# Patient Record
Sex: MALE | Race: WHITE
[De-identification: names, ages, dates, MRNs, and addresses within clinical notes are randomized per-mention and may not be internally consistent; named-entity substitution may affect disease eponyms.]

---

## 2021-03-02 ENCOUNTER — HOSPITAL ENCOUNTER (OUTPATIENT)
Dept: HOSPITAL 75 - ER | Age: 46
LOS: 2 days | Discharge: HOME | End: 2021-03-04
Attending: SURGERY
Payer: SELF-PAY

## 2021-03-02 VITALS — WEIGHT: 315 LBS | HEIGHT: 71.97 IN | BODY MASS INDEX: 42.66 KG/M2

## 2021-03-02 DIAGNOSIS — F17.210: ICD-10-CM

## 2021-03-02 DIAGNOSIS — E66.01: ICD-10-CM

## 2021-03-02 DIAGNOSIS — Z79.899: ICD-10-CM

## 2021-03-02 DIAGNOSIS — Z20.822: ICD-10-CM

## 2021-03-02 DIAGNOSIS — K35.80: Primary | ICD-10-CM

## 2021-03-02 PROCEDURE — 96365 THER/PROPH/DIAG IV INF INIT: CPT

## 2021-03-02 PROCEDURE — 44970 LAPAROSCOPY APPENDECTOMY: CPT

## 2021-03-02 PROCEDURE — 85007 BL SMEAR W/DIFF WBC COUNT: CPT

## 2021-03-02 PROCEDURE — 86141 C-REACTIVE PROTEIN HS: CPT

## 2021-03-02 PROCEDURE — 74177 CT ABD & PELVIS W/CONTRAST: CPT

## 2021-03-02 PROCEDURE — 85027 COMPLETE CBC AUTOMATED: CPT

## 2021-03-02 PROCEDURE — 94760 N-INVAS EAR/PLS OXIMETRY 1: CPT

## 2021-03-02 PROCEDURE — 81000 URINALYSIS NONAUTO W/SCOPE: CPT

## 2021-03-02 PROCEDURE — 96375 TX/PRO/DX INJ NEW DRUG ADDON: CPT

## 2021-03-02 PROCEDURE — 36415 COLL VENOUS BLD VENIPUNCTURE: CPT

## 2021-03-02 PROCEDURE — 88304 TISSUE EXAM BY PATHOLOGIST: CPT

## 2021-03-02 PROCEDURE — 87635 SARS-COV-2 COVID-19 AMP PRB: CPT

## 2021-03-02 PROCEDURE — 99284 EMERGENCY DEPT VISIT MOD MDM: CPT

## 2021-03-02 PROCEDURE — 96376 TX/PRO/DX INJ SAME DRUG ADON: CPT

## 2021-03-02 PROCEDURE — 80053 COMPREHEN METABOLIC PANEL: CPT

## 2021-03-03 VITALS — SYSTOLIC BLOOD PRESSURE: 116 MMHG | DIASTOLIC BLOOD PRESSURE: 73 MMHG

## 2021-03-03 VITALS — SYSTOLIC BLOOD PRESSURE: 102 MMHG | DIASTOLIC BLOOD PRESSURE: 80 MMHG

## 2021-03-03 VITALS — SYSTOLIC BLOOD PRESSURE: 130 MMHG | DIASTOLIC BLOOD PRESSURE: 66 MMHG

## 2021-03-03 VITALS — DIASTOLIC BLOOD PRESSURE: 76 MMHG | SYSTOLIC BLOOD PRESSURE: 121 MMHG

## 2021-03-03 VITALS — SYSTOLIC BLOOD PRESSURE: 108 MMHG | DIASTOLIC BLOOD PRESSURE: 67 MMHG

## 2021-03-03 VITALS — DIASTOLIC BLOOD PRESSURE: 69 MMHG | SYSTOLIC BLOOD PRESSURE: 123 MMHG

## 2021-03-03 VITALS — DIASTOLIC BLOOD PRESSURE: 84 MMHG | SYSTOLIC BLOOD PRESSURE: 107 MMHG

## 2021-03-03 VITALS — SYSTOLIC BLOOD PRESSURE: 127 MMHG | DIASTOLIC BLOOD PRESSURE: 77 MMHG

## 2021-03-03 VITALS — DIASTOLIC BLOOD PRESSURE: 82 MMHG | SYSTOLIC BLOOD PRESSURE: 105 MMHG

## 2021-03-03 VITALS — SYSTOLIC BLOOD PRESSURE: 112 MMHG | DIASTOLIC BLOOD PRESSURE: 73 MMHG

## 2021-03-03 VITALS — DIASTOLIC BLOOD PRESSURE: 69 MMHG | SYSTOLIC BLOOD PRESSURE: 107 MMHG

## 2021-03-03 VITALS — SYSTOLIC BLOOD PRESSURE: 114 MMHG | DIASTOLIC BLOOD PRESSURE: 75 MMHG

## 2021-03-03 VITALS — DIASTOLIC BLOOD PRESSURE: 85 MMHG | SYSTOLIC BLOOD PRESSURE: 146 MMHG

## 2021-03-03 LAB
ALBUMIN SERPL-MCNC: 4.1 GM/DL (ref 3.2–4.5)
ALP SERPL-CCNC: 58 U/L (ref 40–136)
ALT SERPL-CCNC: 44 U/L (ref 0–55)
AMORPH SED URNS QL MICRO: (no result) /LPF
APTT PPP: YELLOW S
BACTERIA #/AREA URNS HPF: NEGATIVE /HPF
BASOPHILS # BLD AUTO: 0 10^3/UL (ref 0–0.1)
BASOPHILS NFR BLD AUTO: 0 % (ref 0–10)
BASOPHILS NFR BLD MANUAL: 0 %
BILIRUB SERPL-MCNC: 0.7 MG/DL (ref 0.1–1)
BILIRUB UR QL STRIP: NEGATIVE
BUN/CREAT SERPL: 18
CALCIUM SERPL-MCNC: 8.6 MG/DL (ref 8.5–10.1)
CHLORIDE SERPL-SCNC: 105 MMOL/L (ref 98–107)
CO2 SERPL-SCNC: 22 MMOL/L (ref 21–32)
CREAT SERPL-MCNC: 0.79 MG/DL (ref 0.6–1.3)
EOSINOPHIL # BLD AUTO: 0 10^3/UL (ref 0–0.3)
EOSINOPHIL NFR BLD AUTO: 0 % (ref 0–10)
EOSINOPHIL NFR BLD MANUAL: 0 %
FIBRINOGEN PPP-MCNC: CLEAR MG/DL
GFR SERPLBLD BASED ON 1.73 SQ M-ARVRAT: > 60 ML/MIN
GLUCOSE SERPL-MCNC: 119 MG/DL (ref 70–105)
GLUCOSE UR STRIP-MCNC: NEGATIVE MG/DL
HCT VFR BLD CALC: 40 % (ref 40–54)
HGB BLD-MCNC: 13.8 G/DL (ref 13.3–17.7)
KETONES UR QL STRIP: (no result)
LEUKOCYTE ESTERASE UR QL STRIP: NEGATIVE
LYMPHOCYTES # BLD AUTO: 0.9 10^3/UL (ref 1–4)
LYMPHOCYTES NFR BLD AUTO: 7 % (ref 12–44)
MANUAL DIFFERENTIAL PERFORMED BLD QL: YES
MCH RBC QN AUTO: 29 PG (ref 25–34)
MCHC RBC AUTO-ENTMCNC: 34 G/DL (ref 32–36)
MCV RBC AUTO: 83 FL (ref 80–99)
MONOCYTES # BLD AUTO: 0.5 10^3/UL (ref 0–1)
MONOCYTES NFR BLD AUTO: 4 % (ref 0–12)
MONOCYTES NFR BLD: 4 %
NEUTROPHILS # BLD AUTO: 11.6 10^3/UL (ref 1.8–7.8)
NEUTROPHILS NFR BLD AUTO: 88 % (ref 42–75)
NEUTS BAND NFR BLD MANUAL: 87 %
NEUTS BAND NFR BLD: 0 %
NITRITE UR QL STRIP: NEGATIVE
PH UR STRIP: 5.5 [PH] (ref 5–9)
PLATELET # BLD: 253 10^3/UL (ref 130–400)
PMV BLD AUTO: 10.9 FL (ref 9–12.2)
POTASSIUM SERPL-SCNC: 3.9 MMOL/L (ref 3.6–5)
PROT SERPL-MCNC: 7.3 GM/DL (ref 6.4–8.2)
PROT UR QL STRIP: (no result)
RBC #/AREA URNS HPF: (no result) /HPF
RBC MORPH BLD: NORMAL
SODIUM SERPL-SCNC: 140 MMOL/L (ref 135–145)
SP GR UR STRIP: >=1.03 (ref 1.02–1.02)
SQUAMOUS #/AREA URNS HPF: (no result) /HPF
VARIANT LYMPHS NFR BLD MANUAL: 9 %
WBC # BLD AUTO: 13.1 10^3/UL (ref 4.3–11)
WBC #/AREA URNS HPF: (no result) /HPF

## 2021-03-03 RX ADMIN — HYDROCODONE BITARTRATE AND ACETAMINOPHEN PRN EA: 5; 325 TABLET ORAL at 22:35

## 2021-03-03 RX ADMIN — FENTANYL CITRATE PRN MCG: 50 INJECTION, SOLUTION INTRAMUSCULAR; INTRAVENOUS at 14:49

## 2021-03-03 RX ADMIN — SODIUM CHLORIDE, SODIUM LACTATE, POTASSIUM CHLORIDE, AND CALCIUM CHLORIDE SCH MLS/HR: 600; 310; 30; 20 INJECTION, SOLUTION INTRAVENOUS at 19:51

## 2021-03-03 RX ADMIN — SODIUM CHLORIDE, SODIUM LACTATE, POTASSIUM CHLORIDE, AND CALCIUM CHLORIDE SCH MLS/HR: 600; 310; 30; 20 INJECTION, SOLUTION INTRAVENOUS at 10:36

## 2021-03-03 RX ADMIN — SODIUM CHLORIDE SCH MLS/HR: 900 INJECTION, SOLUTION INTRAVENOUS at 19:03

## 2021-03-03 RX ADMIN — SODIUM CHLORIDE, SODIUM LACTATE, POTASSIUM CHLORIDE, AND CALCIUM CHLORIDE PRN MLS/HR: 600; 310; 30; 20 INJECTION, SOLUTION INTRAVENOUS at 07:20

## 2021-03-03 RX ADMIN — FENTANYL CITRATE PRN MCG: 50 INJECTION, SOLUTION INTRAMUSCULAR; INTRAVENOUS at 19:54

## 2021-03-03 RX ADMIN — FENTANYL CITRATE PRN MCG: 50 INJECTION, SOLUTION INTRAMUSCULAR; INTRAVENOUS at 17:25

## 2021-03-03 RX ADMIN — SODIUM CHLORIDE, SODIUM LACTATE, POTASSIUM CHLORIDE, AND CALCIUM CHLORIDE SCH MLS/HR: 600; 310; 30; 20 INJECTION, SOLUTION INTRAVENOUS at 09:13

## 2021-03-03 RX ADMIN — SODIUM CHLORIDE SCH MLS/HR: 900 INJECTION, SOLUTION INTRAVENOUS at 11:02

## 2021-03-03 RX ADMIN — SODIUM CHLORIDE, SODIUM LACTATE, POTASSIUM CHLORIDE, AND CALCIUM CHLORIDE PRN MLS/HR: 600; 310; 30; 20 INJECTION, SOLUTION INTRAVENOUS at 07:56

## 2021-03-03 NOTE — PROGRESS NOTE-POST OPERATIVE
Mom requesting a home neb.states the one she has now is broken.    Post-Operative Progess Note


Surgeon (s)/Assistant (s)


Surgeon


BRIANNE KENDALL DO


Assistant:  na





Pre-Operative Diagnosis


acute appendicitis





Post-Operative Diagnosis





acute appendicitis





Procedure & Operative Findings


Date of Procedure


3/3/21


Procedure Performed/Findings


PROCEDURE:


Laparoscopic appendectomy. 


 


COMPLICATIONS:


None. 


 


INDICATIONS:


The patient is a 46 year old male who has been having right lower


quadrant abdominal pain. Patient's exam consistent with appendicitis.


I discussed risk and benefits of laparoscopic appendectomy 


and all indicated procedures with the possibility being a normal appendix. 


The patient understands the risks and benefits and wishes to proceed. 


Consent was signed on the chart. 


 


DESCRIPTION OF PROCEDURE:


The patient was taken to the operating suite, prepped and draped in a sterile


fashion.  Timeout was performed.  Local anesthetic was infiltrated just above


the umbilicus and 11-blade scalpel was used to make a skin incision.  Cautery


was used to dissect down to the fascia and scored.  Kochers were used to grasp


and elevate it and the abdomen was then entered.  A 0 Vicryl was placed in a


figure-of-eight fashion for closure at the end of the case.  The balloon trocar


was inserted into the abdomen and pneumoperitoneum was achieved.  Under direct


visualization of the laparoscope, a 5 mm trocar was placed in the suprapubic


region and a 5 mm trocar was placed in the left lower quadrant.  Appendix was


located, inflamed dilated appendix. The mesoappendix was then divided next to 

the appendix.


Once the base of the appendix was dissected around, an Endo-LUCAS 2.5 stapler was 

then fired across the base of the appendix.  


 It was then placed in an Endobag and removed through 


the 12 mm trocar site.  The abdomen was then irrigated and suctioned.  No other 

pathology noted.  


The abdomen was then desufflated and the trocars were removed.  The 0 Vicryl 

placed at the beginning


of the case was then tied closing the 12 mm fascial defect.  The skin was then


closed using 4-0 Monocryl in a subcuticular fashion.  The abdomen was then


washed and dried and Skin Affix was placed over the incisions.  The patient


tolerated the procedure well without any complications and was taken to the


recovery room in stable condition.


Anesthesia Type


general





Estimated Blood Loss


Estimated blood loss (mL):  minimal





Specimens/Packing


Specimens Removed


BRIANNE Chandra DO               Mar 3, 2021 08:33

## 2021-03-03 NOTE — ANESTHESIA-GENERAL POST-OP
General


Patient Condition


Mental Status/LOC:  Same as Preop


Cardiovascular:  Satisfactory


Nausea/Vomiting:  Absent


Respiratory:  Satisfactory


Pain:  Controlled


Complications:  Absent





Post Op Complications


Complications


None





Follow Up Care/Instructions


Patient Instructions


None needed.





Anesthesia/Patient Condition


Patient Condition


Patient is doing well, no complaints, stable vital signs, no apparent adverse 

anesthesia problems.











ANGUS LEDEZMA DO          Mar 3, 2021 10:46

## 2021-03-03 NOTE — HISTORY & PHYSICAL-SURGICAL
BAINBRIDGE,LUKE MED STUDENT 3/3/21 0629:


History of Present Illness


History of Present Illness


Reason for visit/HPI


CC: RLQ abdominal pain





HPI: Patient presented to the ED around 0040 this AM with complaints of RLQ 

abdominal pain, nausea, vomiting, and chills that started yesterday around noon.

He states that he's vomited a total of 4-5 times without evidence of blood. He 

has had no associated diarrhea. He states that movement or coughing worsens the 

pain. He states that lying still improves the pain. He rates that pain at a 7/10

currently and reports that the pain is now just a constant throbbing pain, but 

initially was intermittent and stabbing in quality. He states that the pain does

not radiate anywhere. Patient winces in pain when palpating RLQ. He does admit 

to meth use 3 days ago. States he hasn't eaten or drank since yesterday around 

1200 or so.


Date of Admission


Mar 3, 2021 at 01:53


Date Seen by a Provider:  Mar 3, 2021


Time Seen by a Provider:  06:15


I consulted on this patient on


3/3/21


 06:23


Attending Physician


Matt Mtz DO


Admitting Physician


No,Local Physician


Consult








Allergies and Home Medications


Allergies


Coded Allergies:  


     No Known Drug Allergies (Unverified , 3/3/21)





Past Medical-Social-Family Hx


Patient Social History


Number of Drinks Today:  0


Drug of Choice:  Methamphetamine


Smoking Status:  Current Everyday Smoker (PPD x 30 years)


Type Used:  Cigarettes


2nd Hand Smoke Exposure:  Yes


Recent Hopitalizations:  No


Physical Abuse Screen:  No


Alcohol Use?:  Yes (drinks one six pack of beer per week on average)


Substance type:  Caffeine, Methamphetamine


Have you traveled recently?:  No





Seasonal Allergies


Seasonal Allergies:  No





Surgeries


History of Surgeries:  No





Respiratory


History of Respiratory Disorde:  No





Cardiovascular


History of Cardiac Disorders:  No





Neurological


History of Neurological Disord:  No





Genitourinary


History of Genitourinary Disor:  No





Gastrointestinal


History of Gastrointestinal Di:  No





Musculoskeletal


History of Musculoskeletal Dis:  Yes


Musculoskeletal Disorders:  Chronic Back Pain





Endocrine


History of Endocrine Disorders:  No





HEENT


History of HEENT Disorders:  Yes


HEENT Disorders:  Cataract (states he has cataracts, unsure of which eye)


Loss of Vision:  Denies


Hearing Impairment:  Denies





Cancer


History of Cancer:  No





Psychosocial


History of Psychiatric Problem:  No





Integumentary


History of Skin or Integumenta:  No





Blood Transfusions


History of Blood Disorders:  No





Family Medical History


Significant Family History:  No Pertinent Family Hx





Review of Systems


Constitutional:  see HPI, chills; No diaphoresis, No dizziness, No fever


EENTM:  dental problems (tooth loss/decay); No hearing loss, No blurred vision, 

No double vision


Respiratory:  no symptoms reported; No cough, No dyspnea on exertion, No short 

of breath, No stridor, No wheezing


Cardiovascular:  no symptoms reported; No chest pain, No edema


Gastrointestinal:  RLQ, see HPI, abdominal pain (RLQ); No diarrhea; nausea, 

vomiting


Genitourinary:  no symptoms reported; No dysuria, No frequency


Musculoskeletal:  back pain, joint pain (Chronic R knee pain)


Skin:  lesions (multiple scattered scabs/excoriations on thorax and Bilat lower 

extrem.  Bilat heels fissured and cracking. )


Psychiatric/Neurological:  No Symptoms Reported; Denies Anxiety, Denies 

Depressed, Denies Headache, Denies Numbness





All Other Systems Reviewed


Negative Unless Noted:  Yes





Physical Exam


Vital Signs





Vital Signs - First Documented








 3/2/21





 23:55


 


Temp 36.3


 


Pulse 94


 


Resp 18


 


B/P (MAP) 166/96 (119)


 


Pulse Ox 95


 


O2 Delivery Room Air





Capillary Refill : Less Than 3 Seconds


Height, Weight, BMI


Height: '"


Weight: lbs. oz. kg; 44.58 BMI


Method:


General Appearance:  No Apparent Distress, WD/WN


Eyes:  Bilateral Eye Normal Inspection, Bilateral Eye PERRL, Bilateral Eye EOMI


HEENT:  PERRL/EOMI, Pharynx Normal


Neck:  Full Range of Motion, Non Tender


Respiratory:  Chest Non Tender, No Accessory Muscle Use, No Respiratory 

Distress, Decreased Breath Sounds; No Rhonci, No Stridor, No Wheezing


Cardiovascular:  Regular Rate, Rhythm, No Edema, No Murmur, Normal Peripheral 

Pulses


Gastrointestinal:  Soft, Abnormal Bowel Sounds (hypoactive bowel sounds); No 

Distended, No Guarding; Tenderness


Rectal:  Deferred


Back:  Normal Inspection, No Vertebral Tenderness


Extremity:  Normal Capillary Refill, Non Tender, No Calf Tenderness


Neurologic/Psychiatric:  Alert, Oriented x3, No Motor/Sensory Deficits, CNs II-

XII Norm as Tested


Skin:  Warm/Dry, Other (scattered scabs/excoriations thorax and legs bilateally.

Bilateral heels cracked/fissured. )


Lymphatic:  No Adenopathy





Data Review


Labs


Laboratory Tests


3/3/21 00:05: 


White Blood Count 13.1H, Red Blood Count 4.83, Hemoglobin 13.8, Hematocrit 40, 

Mean Corpuscular Volume 83, Mean Corpuscular Hemoglobin 29, Mean Corpuscular 

Hemoglobin Concent 34, Red Cell Distribution Width 13.0, Platelet Count 253, 

Mean Platelet Volume 10.9, Immature Granulocyte % (Auto) 0, Neutrophils (%) 

(Auto) 88H, Lymphocytes (%) (Auto) 7L, Monocytes (%) (Auto) 4, Eosinophils (%) 

(Auto) 0, Basophils (%) (Auto) 0, Neutrophils # (Auto) 11.6H, Lymphocytes # 

(Auto) 0.9L, Monocytes # (Auto) 0.5, Eosinophils # (Auto) 0.0, Basophils # 

(Auto) 0.0, Immature Granulocyte # (Auto) 0.1, Neutrophils % (Manual) 87, 

Lymphocytes % (Manual) 9, Monocytes % (Manual) 4, Eosinophils % (Manual) 0, 

Basophils % (Manual) 0, Band Neutrophils 0, Blood Morphology Comment NORMAL, 

Sodium Level 140, Potassium Level 3.9, Chloride Level 105, Carbon Dioxide Level 

22, Anion Gap 13, Blood Urea Nitrogen 14, Creatinine 0.79, Estimat Glomerular 

Filtration Rate > 60, BUN/Creatinine Ratio 18, Glucose Level 119H, Calcium Level

8.6, Corrected Calcium 8.5, Total Bilirubin 0.7, Aspartate Amino Transf 

(AST/SGOT) 31, Alanine Aminotransferase (ALT/SGPT) 44, Alkaline Phosphatase 58, 

C-Reactive Protein High Sensitivity 1.19H, Total Protein 7.3, Albumin 4.1


3/3/21 01:15: 


Urine Color YELLOW, Urine Clarity CLEAR, Urine pH 5.5, Urine Specific Gravity 

>=1.030, Urine Protein 1+H, Urine Glucose (UA) NEGATIVE, Urine Ketones 1+H, 

Urine Nitrite NEGATIVE, Urine Bilirubin NEGATIVE, Urine Urobilinogen 0.2, Urine 

Leukocyte Esterase NEGATIVE, Urine RBC (Auto) NEGATIVE, Urine RBC NONE, Urine 

WBC NONE, Urine Squamous Epithelial Cells NONE, Urine Crystals NONE, Urine 

Amorphous Sediment FEW SERGEY URATESH, Urine Bacteria NEGATIVE, Urine Casts NONE, 

Urine Mucus NEGATIVE, Urine Culture Indicated NO








Assessment/Plan


Assessment/Plan


Admission Diagonsis


Acute appendicitis


Nausea, vomiting, chills


Methamphetamine use disorder


Tobacco abuse


Admission Status:  Inpatient Order (span 2 midnights)


Reason for Inpatient Admission:  


Patient admitted for planned laparoscopic appendectomy this morning


Assessment/Plan


Acute appendicitis


Nausea, vomiting, chills


Methamphetamine use disorder


Tobacco Abuse


Chronic back pain





To OR this am for laparoscopic appendectomy


NPO


Continue IV antibiotics


Zofran PRN nausea


IVP fentanyl for pain control


Vital signs per protocol


Continue LR @ 125ml/hr





MATT MTZ DO 3/3/21 0707:


History of Present Illness


History of Present Illness


Reason for visit/HPI


CC:  RLQ abd pain





rlq abd pain started yesterday about noon.  from umbilicus to right lower 

quadrant. constant sharp stabbing/throbbing pain now more dull. Movement makes 

worse.  Nothing really aking better.  7/10 pain.  + n/v chills and some 

diarrhea.  + meth use.


Ct scan reviewed and consistent with acute appendicitis and appendicolith.





Allergies and Home Medications


Allergies


Coded Allergies:  


     No Known Drug Allergies (Unverified , 3/3/21)





Patient Home Medication List


Home Medication List Reviewed:  Yes





Past Medical-Social-Family Hx


Reviewed Nursing Assessment


Reviewed/Agree w Nursing PMH:  Yes





Family Medical History


Significant Family History:  No Pertinent Family Hx





Review of Systems


Constitutional:  chills; No diaphoresis, No dizziness, No fever


EENTM:  dental problems (tooth loss/decay); No hearing loss, No blurred vision, 

No double vision


Respiratory:  No cough, No dyspnea on exertion, No short of breath, No stridor, 

No wheezing


Cardiovascular:  No chest pain, No edema


Gastrointestinal:  RLQ, abdominal pain (RLQ); No diarrhea; nausea, vomiting


Genitourinary:  No decreased output, No discharge, No dysuria, No frequency


Musculoskeletal:  back pain, joint pain (Chronic R knee pain)


Skin:  lesions (multiple scattered scabs/excoriations on thorax and Bilat lower 

extrem.  Bilat heels fissured and cracking. )


Psychiatric/Neurological:  Denies Anxiety, Denies Depressed





Physical Exam


General Appearance:  WD/WN, Anxious


HEENT:  PERRL/EOMI, Other (poor dentation)


Neck:  Full Range of Motion, Non Tender


Respiratory:  Chest Non Tender, No Accessory Muscle Use, No Respiratory Distress


Cardiovascular:  Regular Rate, Rhythm, No JVD


Gastrointestinal:  Soft; No Distended, No Guarding; Tenderness (right lower 

quadrant)


Rectal:  Deferred


Back:  Normal Inspection, No Vertebral Tenderness


Extremity:  Normal Capillary Refill, Non Tender, No Calf Tenderness


Neurologic/Psychiatric:  Alert, Oriented x3, No Motor/Sensory Deficits


Skin:  Normal Color, Warm/Dry, Other (scattered scabs/excoriations thorax and 

legs bilateally. Bilateral heels cracked/fissured. )


Lymphatic:  No Adenopathy





Assessment/Plan


Assessment/Plan


Admission Diagonsis


RLQ abdominal pain


Acute appendicitis with appendicolith


Nausea, vomiting, chills


Methamphetamine use 


Tobacco abuse


Admission Status:  Observation


Assessment/Plan


RLQ abdominal pain


Acute appendicitis with appendicolith


Nausea, vomiting, chills


Methamphetamine use 


Tobacco abuse





IV abx


NPO


IV fluids


discussed risks and benefits of laparoscopic appendectomy all other indicated 

procedures and he understands and wishes to proceed- to or





Supervisory-Addendum Brief


Verification & Attestation


Participated in pt care:  history, MDM, physical


Personally performed:  exam, history, MDM, supervision of care


Care discussed with:  Medical Student


Procedures:  n/a


Results interpretation:  Verified all documentation


Verification and Attestation of Medical Student E/M Service





A medical student performed and documented this service in my presence. I 

reviewed and verified all information documented by the medical student and made

modifications to such information, when appropriate. I personally performed the 

physical exam and medical decision making. 





 Mtat Mtz, Mar 3, 2021,07:00











BAINBRIDGE,LUKE MED STUDENT     Mar 3, 2021 06:29


MATT MTZ DO               Mar 3, 2021 07:07

## 2021-03-03 NOTE — DIAGNOSTIC IMAGING REPORT
CT ABD/PELV W (APPENDICITIS)



TECHNIQUE: Multiple contiguous axial images were obtained through

the abdomen and pelvis after administration of intravenous

contrast. All CT scans use one or more of the following dose

optimizing techniques: automated exposure control, MA and/or KvP

adjustment based on a patient size and exam type, or iterative

reconstruction. 



INDICATION: Right lower quadrant pain.



COMPARISON: None available. 



FINDINGS:



Lower chest: The lung bases are clear. No pericardial or pleural

effusion. 



Peritoneum: No free intraperitoneal air or fluid.  



Liver and biliary system: Mild hypoattenuation liver raises

possibility hepatic steatosis. No focal hepatic lesion. The main

portal vein is patent. The gallbladder is normal. No biliary duct

dilation. 



Spleen and Pancreas: Spleen is enlarged measuring 17 cm. A large

splenule is present at the inferior and medial margin of the

spleen. Diffuse fatty infiltration is present throughout the

pancreas. No features of acute pancreatitis.



Adrenals: Normal.



 tract: The kidneys enhance normally without suspicious mass or

obstruction. Urinary bladder is distended without wall

thickening.  Prostate is not enlarged.



GI tract: Stomach is partially filled with fluid and there is no

wall thickening. No bowel obstruction.  No pericolonic

inflammatory changes.  The appendix has some hyperdensities

within its lumen which may represent appendicoliths or

inspissated material. The appendix is dilated measuring up to 15

mm and has surrounding stranding indicative of acute

appendicitis. No abscess.



Vasculature and Lymph nodes: Normal caliber aorta. No abdominal

or pelvic lymphadenopathy.



Musculoskeletal: No concerning osseous lesion. 



IMPRESSION: 

1. Acute appendicitis without abscess or silvana perforation.

2. Splenomegaly.

3. Findings are in agreement with the preliminary report.



Dictated by: 



  Dictated on workstation # [id number]

## 2021-03-03 NOTE — ED ABDOMINAL PAIN
General


Chief Complaint:  Abdominal/GI Problems


Stated Complaint:  ABD PAIN


Nursing Triage Note:  


Patient comes in via EMS c/o R LQ pain since noon with nausea/vomiting.


Sepsis Screen:  No Definite Risk


Source of Information:  Patient


Exam Limitations:  No Limitations





History of Present Illness


Date Seen by Provider:  Mar 3, 2021


Time Seen by Provider:  00:02


Initial Comments


Here with complaint of right lower quadrant pain since about noon today that has

worsened.  It is associated with nausea and chills.  Denies diarrhea.  Last ate 

about 1 PM.  Has not had pain like this before.  Does admit to methamphetamine 

abuse with last use 2 days ago.  Previously 1 month prior to that.  Still has 

his appendix.


Timing/Duration:  12 Hours, Getting Worse


Severity/Quality:  Moderate


Location:  [facility]


Radiation:  No Radiation


Activities at Onset:  None


Modifying Factors:  Worsens With Movement; Improves With Resting


Associated Symptoms:  No Back Pain, No Chest Pain, No Fever/Chills, No Shortness

of Air, No Weakness





Allergies and Home Medications


Allergies


Coded Allergies:  


     No Known Drug Allergies (Unverified , 3/3/21)





Patient Home Medication List


Home Medication List Reviewed:  Yes





Review of Systems


Review of Systems


Constitutional:  see HPI, chills; No fever


EENTM:  No Symptoms Reported; No Nose Congestion, No Throat Pain


Respiratory:  Denies Cough, Denies Shortness of Air


Cardiovascular:  Denies Chest Pain, Denies Edema


Gastrointestinal:  See HPI, Abdominal Pain; Denies Diarrhea


Genitourinary:  No Symptoms Reported


Musculoskeletal:  no symptoms reported


Skin:  no symptoms reported





All Other Systems Reviewed


Negative Unless Noted:  Yes





Past Medical-Social-Family Hx


Past Med/Social Hx:  Reviewed Nursing Past Med/Soc Hx


Patient Social History


Alcohol Use:  Occasionally Uses


Drug of Choice:  Methamphetamine


Smoking Status:  Current Everyday Smoker


Recent Infectious Disease Expo:  No





Past Medical History


Surgeries:  No


Respiratory:  No


Cardiac:  No


Neurological:  No


Gastrointestinal:  No


Musculoskeletal:  No


Endocrine:  No





Family Medical History


Reviewed Nursing Family Hx


No Pertinent Family Hx





Physical Exam


Vital Signs





Vital Signs - First Documented








 3/2/21





 23:55


 


Temp 36.3


 


Pulse 94


 


Resp 18


 


B/P (MAP) 166/96 (119)


 


Pulse Ox 95


 


O2 Delivery Room Air





Capillary Refill : Less Than 3 Seconds


Height/Weight/BMI


Height: '"


Weight: lbs. oz. kg; 37.00 BMI


Method:


General Appearance:  WD/WN, no apparent distress


HEENT:  PERRL/EOMI, pharynx normal


Neck:  full range of motion, supple


Respiratory:  lungs clear, normal breath sounds


Cardiovascular:  regular rate, rhythm, no murmur


Gastrointestinal:  No guarding, No rebound; tenderness (Right lower quadrant)


Extremities:  non-tender, normal inspection


Back:  normal inspection, no CVA tenderness, no vertebral tenderness


Neurologic/Psychiatric:  alert, normal mood/affect


Skin:  normal color, warm/dry





Progress/Results/Core Measures


Results/Orders


Lab Results





Laboratory Tests








Test


 3/3/21


00:05 3/3/21


01:15 Range/Units


 


 


White Blood Count


 13.1 H


 


 4.3-11.0


10^3/uL


 


Red Blood Count


 4.83 


 


 4.30-5.52


10^6/uL


 


Hemoglobin 13.8   13.3-17.7  g/dL


 


Hematocrit 40   40-54  %


 


Mean Corpuscular Volume 83   80-99  fL


 


Mean Corpuscular Hemoglobin 29   25-34  pg


 


Mean Corpuscular Hemoglobin


Concent 34 


 


 32-36  g/dL





 


Red Cell Distribution Width 13.0   10.0-14.5  %


 


Platelet Count


 253 


 


 130-400


10^3/uL


 


Mean Platelet Volume 10.9   9.0-12.2  fL


 


Immature Granulocyte % (Auto) 0    %


 


Neutrophils (%) (Auto) 88 H  42-75  %


 


Lymphocytes (%) (Auto) 7 L  12-44  %


 


Monocytes (%) (Auto) 4   0-12  %


 


Eosinophils (%) (Auto) 0   0-10  %


 


Basophils (%) (Auto) 0   0-10  %


 


Neutrophils # (Auto)


 11.6 H


 


 1.8-7.8


10^3/uL


 


Lymphocytes # (Auto)


 0.9 L


 


 1.0-4.0


10^3/uL


 


Monocytes # (Auto)


 0.5 


 


 0.0-1.0


10^3/uL


 


Eosinophils # (Auto)


 0.0 


 


 0.0-0.3


10^3/uL


 


Basophils # (Auto)


 0.0 


 


 0.0-0.1


10^3/uL


 


Immature Granulocyte # (Auto)


 0.1 


 


 0.0-0.1


10^3/uL


 


Neutrophils % (Manual) 87    %


 


Lymphocytes % (Manual) 9    %


 


Monocytes % (Manual) 4    %


 


Eosinophils % (Manual) 0    %


 


Basophils % (Manual) 0    %


 


Band Neutrophils 0    %


 


Blood Morphology Comment NORMAL    


 


Sodium Level 140   135-145  MMOL/L


 


Potassium Level 3.9   3.6-5.0  MMOL/L


 


Chloride Level 105     MMOL/L


 


Carbon Dioxide Level 22   21-32  MMOL/L


 


Anion Gap 13   5-14  MMOL/L


 


Blood Urea Nitrogen 14   7-18  MG/DL


 


Creatinine


 0.79 


 


 0.60-1.30


MG/DL


 


Estimat Glomerular Filtration


Rate > 60 


 


  





 


BUN/Creatinine Ratio 18    


 


Glucose Level 119 H    MG/DL


 


Calcium Level 8.6   8.5-10.1  MG/DL


 


Corrected Calcium 8.5   8.5-10.1  MG/DL


 


Total Bilirubin 0.7   0.1-1.0  MG/DL


 


Aspartate Amino Transf


(AST/SGOT) 31 


 


 5-34  U/L





 


Alanine Aminotransferase


(ALT/SGPT) 44 


 


 0-55  U/L





 


Alkaline Phosphatase 58     U/L


 


C-Reactive Protein High


Sensitivity 1.19 H


 


 0.00-0.50


MG/DL


 


Total Protein 7.3   6.4-8.2  GM/DL


 


Albumin 4.1   3.2-4.5  GM/DL


 


Urine Color  YELLOW   


 


Urine Clarity  CLEAR   


 


Urine pH  5.5  5-9  


 


Urine Specific Gravity  >=1.030  1.016-1.022  


 


Urine Protein  1+ H NEGATIVE  


 


Urine Glucose (UA)  NEGATIVE  NEGATIVE  


 


Urine Ketones  1+ H NEGATIVE  


 


Urine Nitrite  NEGATIVE  NEGATIVE  


 


Urine Bilirubin  NEGATIVE  NEGATIVE  


 


Urine Urobilinogen  0.2  < = 1.0  MG/DL


 


Urine Leukocyte Esterase  NEGATIVE  NEGATIVE  


 


Urine RBC (Auto)  NEGATIVE  NEGATIVE  


 


Urine RBC  NONE   /HPF


 


Urine WBC  NONE   /HPF


 


Urine Squamous Epithelial


Cells 


 NONE 


  /HPF





 


Urine Crystals  NONE   /LPF


 


Urine Amorphous Sediment


 


 FEW SERGEY


URATES H  /LPF





 


Urine Bacteria  NEGATIVE   /HPF


 


Urine Casts  NONE   /LPF


 


Urine Mucus  NEGATIVE   /LPF


 


Urine Culture Indicated  NO   








My Orders





Orders - WILFREDO HERMOSILLO MD


Cbc With Automated Diff (3/3/21 00:09)


Comprehensive Metabolic Panel (3/3/21 00:09)


Hs C Reactive Protein (3/3/21 00:09)


Ua Culture If Indicated (3/3/21 00:09)


Fentanyl  Injection (Sublimaze Injection (3/3/21 00:09)


Ct Abd/Pelv W (Appendicitis) (3/3/21 00:09)


Manual Differential (3/3/21 00:05)


Iohexol Injection (Omnipaque 350 Mg/Ml 1 (3/3/21 01:15)


Received Contrast (Hold Metformin- Contr (3/3/21 01:15)


Sodium Chloride Flush (Catheter Flush Sy (3/3/21 01:15)


Ns (Ivpb) (Sodium Chloride 0.9% Ivpb Bag (3/3/21 01:15)


Piperacillin Sodium/Tazobactam (Zosyn Vi (3/3/21 01:45)





Medications Given in ED





Current Medications








 Medications  Dose


 Ordered  Sig/Josefina


 Route  Start Time


 Stop Time Status Last Admin


Dose Admin


 


 Iohexol  100 ml  ONCE  ONCE


 IV  3/3/21 01:15


 3/3/21 01:16 DC 3/3/21 01:18


100 ML


 


 Sodium Chloride  10 ml  AS NEEDED  PRN


 IV  3/3/21 01:15


    3/3/21 01:18


10 ML


 


 Sodium Chloride  100 ml  ONCE  ONCE


 IV  3/3/21 01:15


 3/3/21 01:16 DC 3/3/21 01:18


80 ML








Vital Signs/I&O











 3/2/21





 23:55


 


Temp 36.3


 


Pulse 94


 


Resp 18


 


B/P (MAP) 166/96 (119)


 


Pulse Ox 95


 


O2 Delivery Room Air














 3/3/21





 00:00


 


Intake Total 300 ml


 


Balance 300 ml














Blood Pressure Mean:                    119











Progress


Progress Note :  


Progress Note


Seen and evaluated.  IV, labs, CT abdomen pelvis with contrast for appendicitis 

protocol and fentanyl 50 mcg IV ordered.  Monitor patient.  0132: Results 

received for CT scan showing acute appendicitis.  I did discuss the case with 

Dr. Mtz.  He is requesting Zosyn 4.5 g IV now and admit and he will take to 

the OR in the morning.  This was discussed with the patient who agrees with 

plan.  Orders written.  Patient to remain n.p.o.





Diagnostic Imaging





   Diagonstic Imaging:  CT


   Plain Films/CT/US/NM/MRI:  abdomen, pelvis


Comments


Findings suggestive of acute appendicitis


   Reviewed:  Reviewed [place] Study, Reviewed by Me, Discussed w/Radiologist





Departure


Communication (Admissions)


Time/Spoke to Admitting Phy:  01:32





Impression





   Primary Impression:  


   Appendicitis


   Qualified Codes:  K35.30 - Acute appendicitis with localized peritonitis, 

   without perforation or gangrene


Disposition:  09 ADMITTED AS INPATIENT


Condition:  Stable





Admissions


Decision to Admit Reason:  Admit from ER (General)


Decision to Admit/Date:  Mar 3, 2021


Time/Decision to Admit Time:  01:32





Departure-Patient Inst.


Referrals:  


UNKNOWN (PCP)


Primary Care Physician











WILFREDO HERMOSILLO MD           Mar 3, 2021 00:43

## 2021-03-04 VITALS — SYSTOLIC BLOOD PRESSURE: 148 MMHG | DIASTOLIC BLOOD PRESSURE: 84 MMHG

## 2021-03-04 VITALS — DIASTOLIC BLOOD PRESSURE: 93 MMHG | SYSTOLIC BLOOD PRESSURE: 137 MMHG

## 2021-03-04 VITALS — DIASTOLIC BLOOD PRESSURE: 84 MMHG | SYSTOLIC BLOOD PRESSURE: 148 MMHG

## 2021-03-04 VITALS — SYSTOLIC BLOOD PRESSURE: 118 MMHG | DIASTOLIC BLOOD PRESSURE: 77 MMHG

## 2021-03-04 RX ADMIN — HYDROCODONE BITARTRATE AND ACETAMINOPHEN PRN EA: 5; 325 TABLET ORAL at 08:36

## 2021-03-04 RX ADMIN — SODIUM CHLORIDE, SODIUM LACTATE, POTASSIUM CHLORIDE, AND CALCIUM CHLORIDE SCH MLS/HR: 600; 310; 30; 20 INJECTION, SOLUTION INTRAVENOUS at 04:08

## 2021-03-04 NOTE — DISCHARGE INST-SIMPLE/STANDARD
Discharge Inst-Standard


Discharge Medications


New, Converted or Re-Newed RX:  RX on Chart





Patient Instructions/Follow Up


Plan of Care/Instructions/FU:  


2 weeks Mtz


Activity as Tolerated:  No


Discharge Diet:  Regular Diet


Other Inst to Patient


Follow up Appt:


Make appointment for 2 week.





Instructions:


No lifting greater than 10 pounds.


No strenuous activity. 


May shower, no tub bath or soaking.


Use incentive spirometer at home as directed.


No Smoking





Skin/Wound Care:


You have special glue over your incision that will fall off on it's own. 





Symptoms to Report:


Appetite Changes, Extremity Discoloration, Numbness/Tingling, Swelling 

Increased, Bleeding Excessive, Eyesight Changes, Pain Increased, Urine Color 

Change, Constipation(Persistent), Fever over 101 degree F, Pain/Pressure in 

chest, Urinating Difficulty, Cough Up/Vomit Blood, Heart Beat Irreg/Pounding, 

Pain/Pressure in jaw, Vaginal Bleeding Increase, Cramps in feet or legs, 

Lightheadedness, Pain/Pressure in shoulder, Diarrhea(Persistent), Memory Changes

Suddenly, Questions/Concerns, Weight gain consecutive days, Dizziness/Fainting, 

Nausea/Vomiting, Shortness of Breath, Weight gain over 2 pounds








If questions or concerns contact your physician 


Or seek help at emergency department.











BRIANNE MTZ DO               Mar 4, 2021 08:09